# Patient Record
Sex: FEMALE | Race: WHITE | NOT HISPANIC OR LATINO | Employment: UNEMPLOYED | URBAN - METROPOLITAN AREA
[De-identification: names, ages, dates, MRNs, and addresses within clinical notes are randomized per-mention and may not be internally consistent; named-entity substitution may affect disease eponyms.]

---
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
## 2025-05-10 ENCOUNTER — HEALTH MAINTENANCE LETTER (OUTPATIENT)
Age: 20
End: 2025-05-10

## 2025-07-20 SDOH — HEALTH STABILITY: PHYSICAL HEALTH: ON AVERAGE, HOW MANY MINUTES DO YOU ENGAGE IN EXERCISE AT THIS LEVEL?: 20 MIN

## 2025-07-20 SDOH — HEALTH STABILITY: PHYSICAL HEALTH: ON AVERAGE, HOW MANY DAYS PER WEEK DO YOU ENGAGE IN MODERATE TO STRENUOUS EXERCISE (LIKE A BRISK WALK)?: 2 DAYS

## 2025-07-23 ENCOUNTER — OFFICE VISIT (OUTPATIENT)
Dept: FAMILY MEDICINE | Facility: CLINIC | Age: 20
End: 2025-07-23
Payer: COMMERCIAL

## 2025-07-23 VITALS
SYSTOLIC BLOOD PRESSURE: 111 MMHG | HEART RATE: 88 BPM | OXYGEN SATURATION: 99 % | HEIGHT: 62 IN | WEIGHT: 106.5 LBS | BODY MASS INDEX: 19.6 KG/M2 | TEMPERATURE: 97.6 F | DIASTOLIC BLOOD PRESSURE: 78 MMHG | RESPIRATION RATE: 16 BRPM

## 2025-07-23 DIAGNOSIS — Z11.3 SCREEN FOR STD (SEXUALLY TRANSMITTED DISEASE): ICD-10-CM

## 2025-07-23 DIAGNOSIS — R14.0 ABDOMINAL BLOATING: ICD-10-CM

## 2025-07-23 DIAGNOSIS — Z00.129 ENCOUNTER FOR ROUTINE CHILD HEALTH EXAMINATION W/O ABNORMAL FINDINGS: Primary | ICD-10-CM

## 2025-07-23 LAB
C TRACH DNA SPEC QL NAA+PROBE: NEGATIVE
HIV 1+2 AB+HIV1 P24 AG SERPL QL IA: NONREACTIVE
N GONORRHOEA DNA SPEC QL NAA+PROBE: NEGATIVE
SPECIMEN TYPE: NORMAL
SPECIMEN TYPE: NORMAL
T PALLIDUM AB SER QL: NONREACTIVE

## 2025-07-23 PROCEDURE — 3078F DIAST BP <80 MM HG: CPT | Performed by: FAMILY MEDICINE

## 2025-07-23 PROCEDURE — 87591 N.GONORRHOEAE DNA AMP PROB: CPT | Performed by: FAMILY MEDICINE

## 2025-07-23 PROCEDURE — 92551 PURE TONE HEARING TEST AIR: CPT | Performed by: FAMILY MEDICINE

## 2025-07-23 PROCEDURE — 86780 TREPONEMA PALLIDUM: CPT | Performed by: FAMILY MEDICINE

## 2025-07-23 PROCEDURE — 3074F SYST BP LT 130 MM HG: CPT | Performed by: FAMILY MEDICINE

## 2025-07-23 PROCEDURE — 1126F AMNT PAIN NOTED NONE PRSNT: CPT | Performed by: FAMILY MEDICINE

## 2025-07-23 PROCEDURE — 99395 PREV VISIT EST AGE 18-39: CPT | Performed by: FAMILY MEDICINE

## 2025-07-23 PROCEDURE — 36415 COLL VENOUS BLD VENIPUNCTURE: CPT | Performed by: FAMILY MEDICINE

## 2025-07-23 PROCEDURE — 87389 HIV-1 AG W/HIV-1&-2 AB AG IA: CPT | Performed by: FAMILY MEDICINE

## 2025-07-23 PROCEDURE — 87491 CHLMYD TRACH DNA AMP PROBE: CPT | Performed by: FAMILY MEDICINE

## 2025-07-23 ASSESSMENT — PAIN SCALES - GENERAL: PAINLEVEL_OUTOF10: NO PAIN (0)

## 2025-07-23 NOTE — PROGRESS NOTES
Preventive Care Visit  [facility]  Krupa Aparicio MD, Family Medicine  Jul 23, 2025    Assessment & Plan   20 year old, here for preventive care.    Encounter for routine child health examination w/o abnormal findings  Normal growth and development.   Has had OCP script in past.  Declines currently.    - BEHAVIORAL/EMOTIONAL ASSESSMENT (88267)  - SCREENING TEST, PURE TONE, AIR ONLY  - SCREENING, VISUAL ACUITY, QUANTITATIVE, BILAT        Screen for STD (sexually transmitted disease)  Full screening requested.  No current sexual activity.  Uses condoms when active.  Declines OCP at this time.    - HIV Antigen Antibody Combo; Future  - Treponema Abs w Reflex to RPR and Titer; Future  - NEISSERIA GONORRHOEA PCR; Future  - CHLAMYDIA TRACHOMATIS PCR; Future  - HIV Antigen Antibody Combo  - Treponema Abs w Reflex to RPR and Titer  - NEISSERIA GONORRHOEA PCR  - CHLAMYDIA TRACHOMATIS PCR    Abdominal bloating  Attempt to determine if certain foods trigger symptoms.  Low FODMAP diet discussed.      Patient has been advised of split billing requirements and indicates understanding: Yes    Patient Instructions   Therapy recommended for mood symptoms.  If you are unable to schedule an appointment with the counselor through your online program at school, notify me and a referral can be placed.    STD screening today.    Notify me if you would like to pursue oral or other contraceptive treatment.      See attached information regarding foods that can have a negative impact on bowel function.               Growth      Normal height and weight    Immunizations   Patient/Parent(s) declined some/all vaccines today.  COVID  MenB Vaccine not indicated.      Anticipatory Guidance    Reviewed age appropriate anticipatory guidance.     Peer pressure    Bullying    Increased responsibility    Limits/ consequences    Social media    TV/ media    School/ homework    Healthy food choices    Family meals    Calcium      Vitamins/ supplements    Weight management    Adequate sleep/ exercise    Sleep issues    Drugs, ETOH, smoking    Body image    Seat belts    Dating/ relationships    Encourage abstinence    Contraception     Safe sex/ STDs    Cleared for sports:  Not addressed    Referrals/Ongoing Specialty Care  None  Verbal Dental Referral: Patient has established dental home    Dyslipidemia Follow Up:  Discussed nutrition    Follow-up    Follow-up Visit   Expected date: Jul 23, 2026      Follow Up Appointment Details:     Follow-up with whom?: PCP    Follow-Up for what?: Well Child Check    How?: In Person               Radha Bryant is presenting for the following:  Well Child (Establish care)      20 year old in for annual exam.          7/23/2025   Additional Questions   Roomed by Cristela Archibald       Multiple values from one day are sorted in reverse-chronological order           7/20/2025   Social   Lives with Family   Recent potential stressors (!) ADJUSTMENT TO CHANGE   History of trauma (!)YES   Family Hx of mental health challenges Unknown   Lack of transportation has limited access to appts/meds No   Do you have housing? (Housing is defined as stable permanent housing and does not include staying outside in a car, in a tent, in an abandoned building, in an overnight shelter, or couch-surfing.) Yes   Are you worried about losing your housing? No   Goes to [employer]. [place] this year      7/20/2025    12:49 PM   Health Risks/Safety   Do you always wear a seat belt? Yes   Helmet use? (!) NO           7/20/2025   TB Screening: Consider immunosuppression as a risk factor for TB   Recent TB infection or positive TB test in patient/family/close contact No   Recent residence in high-risk group setting (correctional facility/health care facility/homeless shelter) No              7/20/2025    12:49 PM   Dyslipidemia   FH: premature cardiovascular disease (!) GRANDPARENT   FH: hyperlipidemia No    Personal risk factors for heart disease NO diabetes, high blood pressure, obesity, smokes cigarettes, kidney problems, heart or kidney transplant, history of Kawasaki disease with an aneurysm, lupus, rheumatoid arthritis, or HIV     Recent Labs   Lab Test 03/26/24  1554   CHOL 134   HDL 54   LDL 68   TRIG 59           7/20/2025    12:49 PM   Diet   What type of water? (!) BOTTLED    (!) FILTERED         7/20/2025   Diet   Do you have questions about your eating?  No   Do you have questions about your weight?  No   What do you regularly drink? Water    Cow's Milk    (!) MILK ALTERNATIVE (E.G. SOY, ALMOND, RIPPLE)    (!) JUICE    (!) COFFEE OR TEA   What type of water? (!) BOTTLED    (!) FILTERED   Do you think you eat healthy foods? Yes   At least 3 servings of food or beverages that have calcium each day? Yes   How would you describe your diet?  No restrictions   In past 12 months, concerned food might run out No   In past 12 months, food has run out/couldn't afford more No       Multiple values from one day are sorted in reverse-chronological order         7/20/2025   Activity   Days per week of moderate/strenuous exercise 2 days   On average, how many minutes do you engage in exercise at this level? 20 min   What do you do for exercise? Walking and stretching   What activities are you involved with? Painting, dancing         7/20/2025    12:49 PM   Media Use   Hours per day of screen time (for entertainment) 6         7/20/2025    12:49 PM   Sleep   Do you have any trouble with sleep? (!) DAYTIME DROWSINESS OR TAKE NAPS    (!) DIFFICULTY FALLING ASLEEP   Was staying up late - better now 12-1 am sleep.        7/20/2025    12:49 PM   School   Are you in school? Yes   What school do you attend?  [employer]   What do you do for work? Not working         7/20/2025    12:49 PM   Vision/Hearing   Vision or hearing concerns No concerns         Teen Screen    Teen Screen completed, reviewed and scanned document  "within chart.        7/20/2025    12:49 PM   AMB [facility] MENSES SECTION   What are your periods like?  Regular    Light flow    Medium flow    (!) HEAVY FLOW   6 days total.  3 days heavier then lighter for remainder. Cramping initially.         Objective     Exam  /78 (BP Location: Right arm, Patient Position: Sitting, Cuff Size: Adult Regular)   Pulse 88   Temp 97.6  F (36.4  C) (Temporal)   Resp 16   Ht 1.565 m (5' 1.61\")   Wt 48.3 kg (106 lb 8 oz)   LMP 07/01/2025 (Exact Date)   SpO2 99%   BMI 19.72 kg/m    Facility age limit for growth %gisella is 20 years.  Facility age limit for growth %gisella is 20 years.  Facility age limit for growth %gisella is 20 years.  Growth %ile SmartLinks can only be used for patients less than 20 years old.    Vision Screen  Vision Screen Details  Reason Vision Screen Not Completed:  (not done, has had eyes examined this year)    Hearing Screen  RIGHT EAR  1000 Hz on Level 40 dB (Conditioning sound): Pass  1000 Hz on Level 20 dB: Pass  2000 Hz on Level 20 dB: Pass  4000 Hz on Level 20 dB: Pass  6000 Hz on Level 20 dB: Pass  8000 Hz on Level 20 dB: Pass  LEFT EAR  8000 Hz on Level 20 dB: Pass  6000 Hz on Level 20 dB: Pass  4000 Hz on Level 20 dB: Pass  2000 Hz on Level 20 dB: Pass  1000 Hz on Level 20 dB: Pass  RIGHT EAR  500 Hz on Level 25 dB: (!) REFER  Results  Hearing Screen Results: Pass      Physical Exam  GENERAL: Active, alert, in no acute distress.  SKIN: Clear. No significant rash, abnormal pigmentation or lesions  HEAD: Normocephalic  EYES: Pupils equal, round, reactive, Extraocular muscles intact. Normal conjunctivae.  EARS: Normal canals. Tympanic membranes are normal; gray and translucent.  NOSE: Normal without discharge.  MOUTH/THROAT: Clear. No oral lesions. Teeth without obvious abnormalities.  NECK: Supple, no masses.  No thyromegaly.  LYMPH NODES: No adenopathy  LUNGS: Clear. No rales, rhonchi, wheezing or retractions  HEART: Regular rhythm. Normal S1/S2. No " murmurs. Normal pulses.  ABDOMEN: Soft, non-tender, not distended, no masses or hepatosplenomegaly. Bowel sounds normal.   NEUROLOGIC: No focal findings. Cranial nerves grossly intact: DTR's normal. Normal gait, strength and tone  BACK: Spine is straight, no scoliosis.  EXTREMITIES: Full range of motion, no deformities  : Normal female external genitalia, Williams stage 5.   BREASTS:  Williams stage 5.  No abnormalities.        Signed Electronically by: Krupa Aparicio MD        This chart was documented by provider using a voice activated software called Dragon in addition to manual typing. There may be vocabulary errors or other grammatical errors due to this.

## 2025-07-23 NOTE — PATIENT INSTRUCTIONS
Therapy recommended for mood symptoms.  If you are unable to schedule an appointment with the counselor through your online program at school, notify me and a referral can be placed.    STD screening today.    Notify me if you would like to pursue oral or other contraceptive treatment.      See attached information regarding foods that can have a negative impact on bowel function.        Patient Education    STRATUSCORE HANDOUT- PATIENT  18 THROUGH 21 YEAR VISITS  Here are some suggestions from [employer] experts that may be of value to your family.     HOW YOU ARE DOING  Enjoy spending time with your family.  Find activities you are really interested in, such as sports, theater, or volunteering.  Try to be responsible for your schoolwork or work obligations.  Always talk through problems and never use violence.  If you get angry with someone, try to walk away.  If you feel unsafe in your home or have been hurt by someone, let us know. Hotlines and community agencies can also provide confidential help.  Talk with us if you are worried about your living or food situation. Community agencies and programs such as SNAP can help.  Don t smoke, vape, or use drugs. Avoid people who do when you can. Talk with us if you are worried about alcohol or drug use in your family.    YOUR DAILY LIFE  Visit the dentist at least twice a year.  Brush your teeth at least twice a day and floss once a day.  Be a healthy eater.  Have vegetables, fruits, lean protein, and whole grains at meals and snacks.  Limit fatty, sugary, salty foods that are low in nutrients, such as candy, chips, and ice cream.  Eat when you re hungry. Stop when you feel satisfied.  Eat breakfast.  Drink plenty of water.  Make sure to get enough calcium every day.  Have 3 or more servings of low-fat (1%) or fat-free milk and other low-fat dairy products, such as yogurt and cheese.  Women: Make sure to eat foods rich in folate, such as fortified grains and  dark- green leafy vegetables.  Aim for at least 1 hour of physical activity every day.  Wear safety equipment when you play sports.  Get enough sleep.  Talk with us about managing your health care and insurance as an adult.    YOUR FEELINGS  Most people have ups and downs. If you are feeling sad, depressed, nervous, irritable, hopeless, or angry, let us know or reach out to another health care professional.  Figure out healthy ways to deal with stress.  Try your best to solve problems and make decisions on your own.  Sexuality is an important part of your life. If you have any questions or concerns, we are here for you.    HEALTHY BEHAVIOR CHOICES  Avoid using drugs, alcohol, tobacco, steroids, and diet pills. Support friends who choose not to use.  If you use drugs or alcohol, let us know or talk with another trusted adult about it. We can help you with quitting or cutting down on your use.  Make healthy decisions about your sexual behavior.  If you are sexually active, always practice safe sex. Always use birth control along with a condom to prevent pregnancy and sexually transmitted infections.  All sexual activity should be something you want. No one should ever force or try to convince you.  Protect your hearing at work, home, and concerts. Keep your earbud volume down.    STAYING SAFE  Always be a safe and cautious .  Insist that everyone use a lap and shoulder seat belt.  Limit the number of friends in the car and avoid driving at night.  Avoid distractions. Never text or talk on the phone while you drive.  Do not ride in a vehicle with someone who has been using drugs or alcohol.  If you feel unsafe driving or riding with someone, call someone you trust to drive you.  Wear helmets and protective gear while playing sports. Wear a helmet when riding a bike, a motorcycle, or an ATV or when skiing or skateboarding.  Always use sunscreen and a hat when you re outside.  Fighting and carrying weapons can be  dangerous. Talk with your parents, teachers, or doctor about how to avoid these situations.        Consistent with Bright Futures: Guidelines for Health Supervision of Infants, Children, and Adolescents, 4th Edition  For more information, go to https://brightfutures.aap.org.
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
